# Patient Record
(demographics unavailable — no encounter records)

---

## 2025-02-27 NOTE — PLAN
[FreeTextEntry1] : Pap Mammo Breast sono for dense breasts Bone density Records release Return 1 year or prn

## 2025-02-27 NOTE — HISTORY OF PRESENT ILLNESS
[postmenopausal] : postmenopausal [Y] : Positive pregnancy history [Currently Active] : currently active [Men] : men [Vaginal] : vaginal [Mammogramdate] : 2024 [TextBox_19] : NOELLE [BreastSonogramDate] : 2024 [TextBox_25] : DENSE TISSUE [PapSmeardate] : 01/24 [BoneDensityDate] : 2024 [TextBox_37] : NOELLE (PRIMARY) [ColonoscopyDate] : 2022 [LMPDate] : 06/2019 [PGHxTotal] : 2 [[facility] : 2 [BannerxLiving] : 2 [FreeTextEntry1] : C-SECTIONS